# Patient Record
Sex: FEMALE | Race: WHITE | NOT HISPANIC OR LATINO | Employment: FULL TIME | URBAN - NONMETROPOLITAN AREA
[De-identification: names, ages, dates, MRNs, and addresses within clinical notes are randomized per-mention and may not be internally consistent; named-entity substitution may affect disease eponyms.]

---

## 2024-02-02 ENCOUNTER — OFFICE VISIT (OUTPATIENT)
Dept: FAMILY MEDICINE CLINIC | Age: 29
End: 2024-02-02
Payer: COMMERCIAL

## 2024-02-02 VITALS
WEIGHT: 137 LBS | DIASTOLIC BLOOD PRESSURE: 64 MMHG | OXYGEN SATURATION: 99 % | HEIGHT: 65 IN | HEART RATE: 67 BPM | SYSTOLIC BLOOD PRESSURE: 102 MMHG | BODY MASS INDEX: 22.82 KG/M2

## 2024-02-02 DIAGNOSIS — Z13.31 NEGATIVE DEPRESSION SCREENING: ICD-10-CM

## 2024-02-02 DIAGNOSIS — D50.9 IRON DEFICIENCY ANEMIA, UNSPECIFIED IRON DEFICIENCY ANEMIA TYPE: ICD-10-CM

## 2024-02-02 DIAGNOSIS — Z13.29 SCREENING FOR THYROID DISORDER: ICD-10-CM

## 2024-02-02 DIAGNOSIS — Z00.00 ROUTINE ADULT HEALTH MAINTENANCE: ICD-10-CM

## 2024-02-02 DIAGNOSIS — Z13.220 LIPID SCREENING: ICD-10-CM

## 2024-02-02 DIAGNOSIS — Z00.00 ENCOUNTER FOR MEDICAL EXAMINATION TO ESTABLISH CARE: ICD-10-CM

## 2024-02-02 DIAGNOSIS — Z00.00 ANNUAL PHYSICAL EXAM: Primary | ICD-10-CM

## 2024-02-02 PROCEDURE — 99385 PREV VISIT NEW AGE 18-39: CPT | Performed by: NURSE PRACTITIONER

## 2024-02-02 RX ORDER — CEPHALEXIN 500 MG/1
500 CAPSULE ORAL 4 TIMES DAILY
COMMUNITY

## 2024-02-02 NOTE — PROGRESS NOTES
"Zoey Robertson presents to [facility] with complaint of  Establish Care    SUBJECTIVE  History of Present Illness    Very pleasant patient is here to establish care and have annual physical.  She was previously seeing Kerline Cray for primary care. She is , no children, but she does plan on having children in the future.  She recently moved from [place] as she got . She works for a [employer] as a supervisor.  Only past medical history includes iron deficiency anemia and history of borderline elevated cholesterol.  She was on iron supplement but stopped taking this recently.  She is also under the care of a dermatologist for left leg rash/lesion, thought to be bacterial cause and is currently on Keflex for this.  She has follow-up in a month for this.    The following portions of the patient's history were reviewed and updated as appropriate: allergies, current medications, past family history, past medical history, past social history, past surgical history and problem list.    OBJECTIVE  Vital Signs:   /64   Pulse 67   Ht 165.1 cm (65\")   Wt 62.1 kg (137 lb)   SpO2 99% Comment: room air  BMI 22.80 kg/m²       Physical Exam  Vitals reviewed.   Constitutional:       General: She is not in acute distress.     Appearance: Normal appearance. She is not ill-appearing.   HENT:      Head: Normocephalic and atraumatic.      Right Ear: Tympanic membrane and ear canal normal.      Left Ear: Tympanic membrane and ear canal normal.      Nose: Nose normal.      Mouth/Throat:      Mouth: Mucous membranes are moist.      Pharynx: Oropharynx is clear.   Neck:      Thyroid: No thyroid mass, thyromegaly or thyroid tenderness.   Cardiovascular:      Rate and Rhythm: Normal rate and regular rhythm.      Pulses: Normal pulses.      Heart sounds: Normal heart sounds.   Pulmonary:      Effort: Pulmonary effort is normal.      Breath sounds: Normal breath sounds. "   Musculoskeletal:      Cervical back: Neck supple.   Lymphadenopathy:      Cervical: No cervical adenopathy.   Skin:     General: Skin is warm and dry.   Neurological:      General: No focal deficit present.      Mental Status: She is alert and oriented to person, place, and time. Mental status is at baseline.   Psychiatric:         Mood and Affect: Mood normal.         Behavior: Behavior normal.         Judgment: Judgment normal.          Results Review:  The following data was reviewed by Sarahi Doe, VANESSA [unfilled] 14:04 EST.  Ferritin (02/01/2023 09:18)  Iron Profile (02/01/2023 09:18)  TSH (02/01/2023 09:18)  Lipid Panel With / Chol / HDL Ratio (02/01/2023 09:18)  Comprehensive Metabolic Panel (02/01/2023 09:18)  CBC (No Diff) (02/01/2023 09:18)    ASSESSMENT AND PLAN:  Diagnoses and all orders for this visit:    1. Annual physical exam (Primary)    2. Encounter for medical examination to establish care    3. Negative depression screening    4. Routine adult health maintenance  -     CBC & Differential; Future  -     Comprehensive Metabolic Panel; Future    5. Screening for thyroid disorder  -     TSH Rfx On Abnormal To Free T4; Future    6. Iron deficiency anemia, unspecified iron deficiency anemia type  -     Ferritin; Future  -     Iron Profile; Future    7. Lipid screening  -     Lipid Panel; Future        19 to 39: Counseling/Anticipatory Guidance Discussed: nutrition, family planning/contraception, physical activity, healthy weight, injury prevention, misuse of tobacco, alcohol and drugs, dental health, mental health, immunizations, and breast cancer and self breast exams    Follow Up   Return for depending on lab results. Patient to notify office with any acute concerns or issues.  Patient verbalizes understanding, agrees with plan of care and has no further questions upon discharge.     Patient was given instructions and counseling regarding her condition or for health maintenance advice. Please see  specific information pulled into the AVS if appropriate.     Discussed the importance of following up with any needed screening tests/labs/specialist appointments and any requested follow-up recommended by me today. Importance of maintaining follow-up discussed and patient accepts that missed appointments can delay diagnosis and potentially lead to worsening of conditions.    Part of this note may be an electronic transcription/translation of spoken language to printed text using the Dragon Dictation System.

## 2024-02-07 ENCOUNTER — LAB (OUTPATIENT)
Dept: LAB | Facility: HOSPITAL | Age: 29
End: 2024-02-07
Payer: COMMERCIAL

## 2024-02-07 DIAGNOSIS — Z13.29 SCREENING FOR THYROID DISORDER: ICD-10-CM

## 2024-02-07 DIAGNOSIS — Z00.00 ROUTINE ADULT HEALTH MAINTENANCE: ICD-10-CM

## 2024-02-07 DIAGNOSIS — D50.9 IRON DEFICIENCY ANEMIA, UNSPECIFIED IRON DEFICIENCY ANEMIA TYPE: ICD-10-CM

## 2024-02-07 DIAGNOSIS — Z13.220 LIPID SCREENING: ICD-10-CM

## 2024-02-07 LAB
ALBUMIN SERPL-MCNC: 4.5 G/DL (ref 3.5–5.2)
ALBUMIN/GLOB SERPL: 1.8 G/DL
ALP SERPL-CCNC: 48 U/L (ref 39–117)
ALT SERPL W P-5'-P-CCNC: 14 U/L (ref 1–33)
ANION GAP SERPL CALCULATED.3IONS-SCNC: 9.5 MMOL/L (ref 5–15)
AST SERPL-CCNC: 20 U/L (ref 1–32)
BASOPHILS # BLD AUTO: 0.04 10*3/MM3 (ref 0–0.2)
BASOPHILS NFR BLD AUTO: 1.1 % (ref 0–1.5)
BILIRUB SERPL-MCNC: 1.3 MG/DL (ref 0–1.2)
BUN SERPL-MCNC: 13 MG/DL (ref 6–20)
BUN/CREAT SERPL: 15.1 (ref 7–25)
CALCIUM SPEC-SCNC: 9.3 MG/DL (ref 8.6–10.5)
CHLORIDE SERPL-SCNC: 101 MMOL/L (ref 98–107)
CHOLEST SERPL-MCNC: 204 MG/DL (ref 0–200)
CO2 SERPL-SCNC: 26.5 MMOL/L (ref 22–29)
CREAT SERPL-MCNC: 0.86 MG/DL (ref 0.57–1)
DEPRECATED RDW RBC AUTO: 38.5 FL (ref 37–54)
EGFRCR SERPLBLD CKD-EPI 2021: 95.7 ML/MIN/1.73
EOSINOPHIL # BLD AUTO: 0.09 10*3/MM3 (ref 0–0.4)
EOSINOPHIL NFR BLD AUTO: 2.4 % (ref 0.3–6.2)
ERYTHROCYTE [DISTWIDTH] IN BLOOD BY AUTOMATED COUNT: 11.8 % (ref 12.3–15.4)
FERRITIN SERPL-MCNC: 14.7 NG/ML (ref 13–150)
GLOBULIN UR ELPH-MCNC: 2.5 GM/DL
GLUCOSE SERPL-MCNC: 93 MG/DL (ref 65–99)
HCT VFR BLD AUTO: 38.3 % (ref 34–46.6)
HDLC SERPL-MCNC: 77 MG/DL (ref 40–60)
HGB BLD-MCNC: 12.7 G/DL (ref 12–15.9)
IMM GRANULOCYTES # BLD AUTO: 0 10*3/MM3 (ref 0–0.05)
IMM GRANULOCYTES NFR BLD AUTO: 0 % (ref 0–0.5)
IRON 24H UR-MRATE: 81 MCG/DL (ref 37–145)
IRON SATN MFR SERPL: 20 % (ref 20–50)
LDLC SERPL CALC-MCNC: 119 MG/DL (ref 0–100)
LDLC/HDLC SERPL: 1.54 {RATIO}
LYMPHOCYTES # BLD AUTO: 1.48 10*3/MM3 (ref 0.7–3.1)
LYMPHOCYTES NFR BLD AUTO: 39.1 % (ref 19.6–45.3)
MCH RBC QN AUTO: 28.9 PG (ref 26.6–33)
MCHC RBC AUTO-ENTMCNC: 33.2 G/DL (ref 31.5–35.7)
MCV RBC AUTO: 87.2 FL (ref 79–97)
MONOCYTES # BLD AUTO: 0.34 10*3/MM3 (ref 0.1–0.9)
MONOCYTES NFR BLD AUTO: 9 % (ref 5–12)
NEUTROPHILS NFR BLD AUTO: 1.84 10*3/MM3 (ref 1.7–7)
NEUTROPHILS NFR BLD AUTO: 48.4 % (ref 42.7–76)
PLATELET # BLD AUTO: 180 10*3/MM3 (ref 140–450)
PMV BLD AUTO: 10.3 FL (ref 6–12)
POTASSIUM SERPL-SCNC: 4 MMOL/L (ref 3.5–5.2)
PROT SERPL-MCNC: 7 G/DL (ref 6–8.5)
RBC # BLD AUTO: 4.39 10*6/MM3 (ref 3.77–5.28)
SODIUM SERPL-SCNC: 137 MMOL/L (ref 136–145)
TIBC SERPL-MCNC: 396 MCG/DL (ref 298–536)
TRANSFERRIN SERPL-MCNC: 266 MG/DL (ref 200–360)
TRIGL SERPL-MCNC: 43 MG/DL (ref 0–150)
TSH SERPL DL<=0.05 MIU/L-ACNC: 1.28 UIU/ML (ref 0.27–4.2)
VLDLC SERPL-MCNC: 8 MG/DL (ref 5–40)
WBC NRBC COR # BLD AUTO: 3.79 10*3/MM3 (ref 3.4–10.8)

## 2024-02-07 PROCEDURE — 36415 COLL VENOUS BLD VENIPUNCTURE: CPT

## 2024-02-07 PROCEDURE — 80061 LIPID PANEL: CPT

## 2024-02-07 PROCEDURE — 82728 ASSAY OF FERRITIN: CPT

## 2024-02-07 PROCEDURE — 80050 GENERAL HEALTH PANEL: CPT

## 2024-02-07 PROCEDURE — 83540 ASSAY OF IRON: CPT

## 2024-02-07 PROCEDURE — 84466 ASSAY OF TRANSFERRIN: CPT

## 2024-07-10 ENCOUNTER — OFFICE VISIT (OUTPATIENT)
Dept: OBSTETRICS AND GYNECOLOGY | Facility: CLINIC | Age: 29
End: 2024-07-10
Payer: COMMERCIAL

## 2024-07-10 VITALS
BODY MASS INDEX: 23.13 KG/M2 | SYSTOLIC BLOOD PRESSURE: 117 MMHG | WEIGHT: 139 LBS | DIASTOLIC BLOOD PRESSURE: 80 MMHG | HEART RATE: 89 BPM

## 2024-07-10 DIAGNOSIS — Z01.419 WELL WOMAN EXAM WITH ROUTINE GYNECOLOGICAL EXAM: Primary | ICD-10-CM

## 2024-07-10 RX ORDER — CLINDAMYCIN PHOSPHATE 10 MG/G
1 GEL TOPICAL 2 TIMES DAILY
Qty: 30 G | Refills: 0 | Status: SHIPPED | OUTPATIENT
Start: 2024-07-10

## 2024-07-10 NOTE — PROGRESS NOTES
Chief Complaint   Patient presents with    Well woman exam with routine gynecological exam        Zoey Robertson is a 29 y.o.  who presents for an annual examination    Pap history:  Last pap:  NIL  Prior abnormal paps: no  STDs  Sexually active: yes, got  in the last year  History of STDs: no  Has had HPV vaccine: yes  Contraception:  TTC, taking PNV    Screening for BRCA-   Is patient's family history significant for BRCA risk factors? no    Past Medical History:   Diagnosis Date    Hyperlipidemia 2015    Polycystic ovary syndrome 2015    I went to an Endocrinologist. She stated I have pre-PCOS.   - was told she may have PCO from an endocrinologist in her teens.  Periods were irregular at that time.       Past Surgical History:   Procedure Laterality Date    WISDOM TOOTH EXTRACTION       OB History    Para Term  AB Living   0 0 0 0 0 0   SAB IAB Ectopic Molar Multiple Live Births   0 0 0 0 0 0      Social History     Tobacco Use    Smoking status: Never    Smokeless tobacco: Never   Vaping Use    Vaping status: Never Used   Substance Use Topics    Alcohol use: Yes     Alcohol/week: 1.0 standard drink of alcohol     Types: 1 Glasses of wine per week    Drug use: Never     Family History   Problem Relation Age of Onset    No Known Problems Mother     No Known Problems Father     No Known Problems Sister     No Known Problems Brother     Breast cancer Neg Hx     Ovarian cancer Neg Hx     Uterine cancer Neg Hx     Colon cancer Neg Hx      Current Outpatient Medications on File Prior to Visit   Medication Sig Dispense Refill    cephalexin (KEFLEX) 500 MG capsule Take 1 capsule by mouth As Needed.       No current facility-administered medications on file prior to visit.     No Known Allergies     Review of Systems  See HPI    OBJECTIVE:   Vitals:    07/10/24 1445   BP: 117/80   Pulse: 89   Weight: 63 kg (139 lb)      Physical Exam  Exam conducted with a chaperone present.    Constitutional:       General: She is not in acute distress.     Appearance: She is well-developed. She is not diaphoretic.   HENT:      Head: Normocephalic and atraumatic.   Neck:      Thyroid: No thyromegaly.      Trachea: No tracheal deviation.   Cardiovascular:      Rate and Rhythm: Normal rate.      Heart sounds: Normal heart sounds. No murmur heard.     No friction rub. No gallop.   Pulmonary:      Effort: Pulmonary effort is normal. No respiratory distress.      Breath sounds: Normal breath sounds.   Chest:      Chest wall: No tenderness.   Breasts:     Right: No inverted nipple, mass, nipple discharge, skin change or tenderness.      Left: No inverted nipple, mass, nipple discharge, skin change or tenderness.   Abdominal:      General: There is no distension.      Palpations: Abdomen is soft. There is no mass.      Tenderness: There is no abdominal tenderness.   Genitourinary:     General: Normal vulva.      Labia:         Right: No rash, lesion or injury.         Left: No rash, lesion or injury.       Vagina: No vaginal discharge, tenderness or bleeding.      Cervix: No cervical motion tenderness, discharge or friability.      Uterus: Not deviated, not enlarged, not fixed and not tender.       Adnexa:         Right: No mass, tenderness or fullness.          Left: No mass, tenderness or fullness.     Musculoskeletal:         General: No deformity. Normal range of motion.   Lymphadenopathy:      Cervical: No cervical adenopathy.   Skin:     General: Skin is warm and dry.      Findings: No rash.   Neurological:      Mental Status: She is alert and oriented to person, place, and time.   Psychiatric:         Behavior: Behavior normal.         Thought Content: Thought content normal.         Judgment: Judgment normal.         ASSESSMENT/PLAN:     Annual well woman exam:  Cervical cancer screening:    Denies cervical dysplasia in past   HPV vaccination thinks she completed at least one of the HPV  vaccination   The patient is due for a pap in 2025.    Screening guidelines discussed with patient  Breast cancer screening:    Clinical breast exam recommended for age 20-39 years every 1-3 years   Mammogram recommended starting age 40    Breast self awareness encouraged  STD Screening   declines  Contraception :  TTC ,on PNV  Family history    does not demonstrate need for genetics referral   Healthy lifestyle counseling:   return for routine annual checkups    BMI Counseling  Body mass index is 23.13 kg/m².       No follow-ups on file.

## 2024-08-17 ENCOUNTER — HOSPITAL ENCOUNTER (EMERGENCY)
Facility: HOSPITAL | Age: 29
Discharge: HOME | End: 2024-08-17
Attending: EMERGENCY MEDICINE
Payer: COMMERCIAL

## 2024-08-17 ENCOUNTER — TELEPHONE (OUTPATIENT)
Dept: OBSTETRICS AND GYNECOLOGY | Facility: CLINIC | Age: 29
End: 2024-08-17
Payer: COMMERCIAL

## 2024-08-17 ENCOUNTER — APPOINTMENT (OUTPATIENT)
Dept: ULTRASOUND IMAGING | Facility: HOSPITAL | Age: 29
End: 2024-08-17
Payer: COMMERCIAL

## 2024-08-17 VITALS
DIASTOLIC BLOOD PRESSURE: 63 MMHG | WEIGHT: 138 LBS | HEART RATE: 64 BPM | BODY MASS INDEX: 22.99 KG/M2 | RESPIRATION RATE: 16 BRPM | HEIGHT: 65 IN | OXYGEN SATURATION: 97 % | TEMPERATURE: 98 F | SYSTOLIC BLOOD PRESSURE: 98 MMHG

## 2024-08-17 DIAGNOSIS — O20.0 THREATENED MISCARRIAGE IN EARLY PREGNANCY: Primary | ICD-10-CM

## 2024-08-17 LAB
ABO GROUP BLD: NORMAL
BASOPHILS # BLD AUTO: 0.04 10*3/MM3 (ref 0–0.2)
BASOPHILS NFR BLD AUTO: 0.6 % (ref 0–1.5)
DEPRECATED RDW RBC AUTO: 41 FL (ref 37–54)
EOSINOPHIL # BLD AUTO: 0.03 10*3/MM3 (ref 0–0.4)
EOSINOPHIL NFR BLD AUTO: 0.5 % (ref 0.3–6.2)
ERYTHROCYTE [DISTWIDTH] IN BLOOD BY AUTOMATED COUNT: 12.6 % (ref 12.3–15.4)
HCG INTACT+B SERPL-ACNC: 375 MIU/ML
HCT VFR BLD AUTO: 41.7 % (ref 34–46.6)
HGB BLD-MCNC: 13.6 G/DL (ref 12–15.9)
IMM GRANULOCYTES # BLD AUTO: 0.02 10*3/MM3 (ref 0–0.05)
IMM GRANULOCYTES NFR BLD AUTO: 0.3 % (ref 0–0.5)
LYMPHOCYTES # BLD AUTO: 1.24 10*3/MM3 (ref 0.7–3.1)
LYMPHOCYTES NFR BLD AUTO: 19.2 % (ref 19.6–45.3)
MCH RBC QN AUTO: 29.1 PG (ref 26.6–33)
MCHC RBC AUTO-ENTMCNC: 32.6 G/DL (ref 31.5–35.7)
MCV RBC AUTO: 89.1 FL (ref 79–97)
MONOCYTES # BLD AUTO: 0.53 10*3/MM3 (ref 0.1–0.9)
MONOCYTES NFR BLD AUTO: 8.2 % (ref 5–12)
NEUTROPHILS NFR BLD AUTO: 4.6 10*3/MM3 (ref 1.7–7)
NEUTROPHILS NFR BLD AUTO: 71.2 % (ref 42.7–76)
NRBC BLD AUTO-RTO: 0 /100 WBC (ref 0–0.2)
PLATELET # BLD AUTO: 176 10*3/MM3 (ref 140–450)
PMV BLD AUTO: 11 FL (ref 6–12)
RBC # BLD AUTO: 4.68 10*6/MM3 (ref 3.77–5.28)
RH BLD: NEGATIVE
WBC NRBC COR # BLD AUTO: 6.46 10*3/MM3 (ref 3.4–10.8)

## 2024-08-17 PROCEDURE — 76817 TRANSVAGINAL US OBSTETRIC: CPT

## 2024-08-17 PROCEDURE — 84702 CHORIONIC GONADOTROPIN TEST: CPT | Performed by: EMERGENCY MEDICINE

## 2024-08-17 PROCEDURE — 86900 BLOOD TYPING SEROLOGIC ABO: CPT | Performed by: EMERGENCY MEDICINE

## 2024-08-17 PROCEDURE — 96372 THER/PROPH/DIAG INJ SC/IM: CPT

## 2024-08-17 PROCEDURE — 85025 COMPLETE CBC W/AUTO DIFF WBC: CPT | Performed by: EMERGENCY MEDICINE

## 2024-08-17 PROCEDURE — 25010000002 RHO D IMMUNE GLOBULIN 1500 UNIT/2ML SOLUTION PREFILLED SYRINGE: Performed by: EMERGENCY MEDICINE

## 2024-08-17 PROCEDURE — 99284 EMERGENCY DEPT VISIT MOD MDM: CPT

## 2024-08-17 PROCEDURE — 36415 COLL VENOUS BLD VENIPUNCTURE: CPT

## 2024-08-17 PROCEDURE — 76815 OB US LIMITED FETUS(S): CPT

## 2024-08-17 PROCEDURE — 86901 BLOOD TYPING SEROLOGIC RH(D): CPT | Performed by: EMERGENCY MEDICINE

## 2024-08-17 RX ADMIN — HUMAN RHO(D) IMMUNE GLOBULIN 300 MCG: 1500 SOLUTION INTRAMUSCULAR; INTRAVENOUS at 19:50

## 2024-08-17 NOTE — ED PROVIDER NOTES
EMERGENCY DEPARTMENT ENCOUNTER  Room Number:  40/40  PCP: Sarahi Doe APRN  Independent Historians: Patient      HPI:  Chief Complaint: had concerns including Vaginal Bleeding - Pregnant.     A complete HPI/ROS/PMH/PSH/SH/FH are unobtainable due to: Nothing      Context: Zoey Robertson is a 29 y.o. female with a medical history of hyperlipidemia, PCOS who presents to the ED c/o acute vaginal bleeding.  She is a G1, P0 at 6 weeks 2 days EGA.  She reports that bleeding just started today.  She reports minimal pelvic cramping that is mostly in the midline, not lateralizing.  She denies bleeding enough to soak a pad.  She states that she did pass 2 very small clots.  She denies recent nausea or vomiting.  Her OB/GYN is Dr. Finley however she has not yet seen her for initial prenatal visit or ultrasound.      Review of prior external notes (non-ED) -and- Review of prior external test results outside of this encounter: I reviewed July 2024 visit with Dr. Finley for annual exam.        PAST MEDICAL HISTORY  Active Ambulatory Problems     Diagnosis Date Noted    Screening for ischemic heart disease 10/11/2021     Resolved Ambulatory Problems     Diagnosis Date Noted    No Resolved Ambulatory Problems     Past Medical History:   Diagnosis Date    Hyperlipidemia 06/2015    Polycystic ovary syndrome 06/2015         PAST SURGICAL HISTORY  Past Surgical History:   Procedure Laterality Date    WISDOM TOOTH EXTRACTION           FAMILY HISTORY  Family History   Problem Relation Age of Onset    No Known Problems Mother     No Known Problems Father     No Known Problems Sister     No Known Problems Brother     Breast cancer Neg Hx     Ovarian cancer Neg Hx     Uterine cancer Neg Hx     Colon cancer Neg Hx          SOCIAL HISTORY  Social History     Socioeconomic History    Marital status: Single   Tobacco Use    Smoking status: Never    Smokeless tobacco: Never   Vaping Use    Vaping status: Never Used   Substance and Sexual Activity     Alcohol use: Yes     Alcohol/week: 1.0 standard drink of alcohol     Types: 1 Glasses of wine per week    Drug use: Never    Sexual activity: Yes     Partners: Male     Birth control/protection: Coitus interruptus         ALLERGIES  Patient has no known allergies.      REVIEW OF SYSTEMS  Review of all 14 systems is negative other than stated in the HPI above.      PHYSICAL EXAM    I have reviewed the triage vital signs and nursing notes.    ED Triage Vitals   Temp Heart Rate Resp BP SpO2   08/17/24 1705 08/17/24 1705 08/17/24 1705 08/17/24 1719 08/17/24 1705   98 °F (36.7 °C) 105 19 123/80 98 %      Temp src Heart Rate Source Patient Position BP Location FiO2 (%)   -- -- -- -- --                GENERAL: awake and alert, no acute distress  HENT: Normocephalic, atraumatic  EYES: no scleral icterus  CV: regular rhythm, regular rate  RESPIRATORY: normal effort  ABDOMEN: soft, nondistended, nontender throughout  MUSCULOSKELETAL: no deformity  NEURO: alert, moves all extremities, follows commands  PSYCH: calm, cooperative  SKIN: Warm, dry          LAB RESULTS  Recent Results (from the past 24 hour(s))   ABO / Rh    Collection Time: 08/17/24  6:03 PM    Specimen: Blood   Result Value Ref Range    ABO Type B     RH type Negative    hCG, Quantitative, Pregnancy    Collection Time: 08/17/24  6:03 PM    Specimen: Blood   Result Value Ref Range    HCG Quantitative 375.00 mIU/mL   CBC Auto Differential    Collection Time: 08/17/24  6:03 PM    Specimen: Blood   Result Value Ref Range    WBC 6.46 3.40 - 10.80 10*3/mm3    RBC 4.68 3.77 - 5.28 10*6/mm3    Hemoglobin 13.6 12.0 - 15.9 g/dL    Hematocrit 41.7 34.0 - 46.6 %    MCV 89.1 79.0 - 97.0 fL    MCH 29.1 26.6 - 33.0 pg    MCHC 32.6 31.5 - 35.7 g/dL    RDW 12.6 12.3 - 15.4 %    RDW-SD 41.0 37.0 - 54.0 fl    MPV 11.0 6.0 - 12.0 fL    Platelets 176 140 - 450 10*3/mm3    Neutrophil % 71.2 42.7 - 76.0 %    Lymphocyte % 19.2 (L) 19.6 - 45.3 %    Monocyte % 8.2 5.0 - 12.0 %     Eosinophil % 0.5 0.3 - 6.2 %    Basophil % 0.6 0.0 - 1.5 %    Immature Grans % 0.3 0.0 - 0.5 %    Neutrophils, Absolute 4.60 1.70 - 7.00 10*3/mm3    Lymphocytes, Absolute 1.24 0.70 - 3.10 10*3/mm3    Monocytes, Absolute 0.53 0.10 - 0.90 10*3/mm3    Eosinophils, Absolute 0.03 0.00 - 0.40 10*3/mm3    Basophils, Absolute 0.04 0.00 - 0.20 10*3/mm3    Immature Grans, Absolute 0.02 0.00 - 0.05 10*3/mm3    nRBC 0.0 0.0 - 0.2 /100 WBC       The above labs were ordered by me and independently reviewed by me.     RADIOLOGY  US Ob Limited 1 + Fetuses    Result Date: 8/17/2024  FIRST TRIMESTER OB ULTRASOUND  COMPARISON: None  HISTORY: Vaginal bleeding, current beta hCG level of 375  TECHNIQUE: Grayscale, color Doppler and spectral Doppler evaluation of the pelvis with transabdominal and transvaginal probes.  FINDINGS:  The uterus measures approximately 8 cm in length.  The endometrium is thickened with an ovoid hypoechoic collection measuring up to approximately 0.4 cm  The ovaries are normal in size. A 1.8 cm left ovarian cystic lesion is present..  There are normal arterial and venous waveforms in both ovaries.  No free fluid is seen in the cul-de-sac.       The endometrium is thickened with and intrauterine ovoid hypoechoic collection measuring up to 0.4 cm. Given patient's low beta-hCG levels, findings are favored to represent early gestational sac; however, the constellation of findings technically meet criteria for pregnancy of unknown location as a viable intrauterine pregnancy cannot be confirmed at this time. Therefore, continued close obstetric follow-up with serial beta hCG measurements and repeat pelvic sonogram in 4 to 7 days is recommended in order to confirm viable intrauterine pregnancy.  This report was finalized on 8/17/2024 7:36 PM by Dr. Henrry Noland M.D on Workstation: [id number]       The above radiology studies were ordered by me.  See ED course for independent interpretations.     MEDICATIONS GIVEN IN  ER  Medications   Rho D Immune Globulin (RHOPHYLAC) injection 300 mcg (300 mcg Intramuscular Given 24)         ORDERS PLACED DURING THIS VISIT:  Orders Placed This Encounter   Procedures    US Ob Transvaginal    US Ob Limited 1 + Fetuses    hCG, Quantitative, Pregnancy    CBC Auto Differential    ABO / Rh    CBC & Differential         OUTPATIENT MEDICATION MANAGEMENT:  No current Epic-ordered facility-administered medications on file.     No current Epic-ordered outpatient medications on file.         PROCEDURES  Procedures            PROGRESS, DATA ANALYSIS, CONSULTS, AND MEDICAL DECISION MAKING  All labs have been independently interpreted by me.  All radiology studies have been reviewed by me. All EKG's have been independently viewed and interpreted by me.  Discussion below represents my analysis of pertinent findings related to patient's condition, differential diagnosis, treatment plan and final disposition.    Differential diagnosis includes but is not limited to:  Threatened miscarriage   and miscarriage  Missed AB  Ectopic pregnancy  Subchorionic hematoma      Clinical Scores:                  ED Course as of 24 2307   Sat Aug 17, 2024   1832 Hemoglobin: 13.6 [JR]   1914 ABO Type: B [JR]   1914 RH type: Negative [JR]   1914 Rhogam ordered   [JR]   2100 Patient's ultrasound is inconclusive.  There is no definitive IUP seen.  Her beta-hCG is lower than would be expected for a 6-week gestation.  She may have incorrect dates versus early miscarriage versus ectopic pregnancy.  She was given strict return precautions and advised to call Dr. Finley's office on Monday to arrange close follow-up for serial beta-hCG and return repeat ultrasound.  Return precautions were discussed. [JR]      ED Course User Index  [JR] Boom Juarez MD             AS OF 23:07 EDT VITALS:    BP - 98/63  HR - 64  TEMP - 98 °F (36.7 °C)  O2 SATS - 97%    COMPLEXITY OF CARE        Chronic or social  conditions impacting patient care (Social Determinants of Health):     DIAGNOSIS  Final diagnoses:   Threatened miscarriage in early pregnancy           DISPOSITION  DISCHARGE    Patient discharged in stable condition.    Reviewed implications of results, diagnosis, meds, responsibility to follow up, warning signs and symptoms of possible worsening, potential complications and reasons to return to ER.    Patient/Family voiced understanding of above instructions.    Discussed plan for discharge, as there is no emergent indication for admission. Patient referred to primary care provider for BP management due to today's BP. Pt/family is agreeable and understands need for follow up and repeat testing.  Pt is aware that discharge does not mean that nothing is wrong but it indicates no emergency is present that requires admission and they must continue care with follow-up as given below or physician of their choice.     FOLLOW-UP  Jade Finley MD  [facility] [place] [address]  [phone]    Schedule an appointment as soon as possible for a visit            Medication List      No changes were made to your prescriptions during this visit.             Prescription drug monitoring program review:           Please note that portions of this document were completed with a voice recognition program.    Note Disclaimer: At [facility], we believe that sharing information builds trust and better relationships. You are receiving this note because you recently visited [facility]. It is possible you will see health information before a provider has talked with you about it. This kind of information can be easy to misunderstand. To help you fully understand what it means for your health, we urge you to discuss this note with your provider.         Boom Juarez MD  08/17/24 2979

## 2024-08-17 NOTE — TELEPHONE ENCOUNTER
Patient called with concerns of having some bleeding that is maybe more than spotting today.  She is also been having some mild cramping.  This is her first pregnancy.  She would be about 6 weeks by dates.  She has not yet had an ultrasound.    Since we have not yet performed an ultrasound to establish viability/pregnancy location, I recommended that the patient come to the hospital today at [facility] in [place] through the emergency department to be evaluated.  She verbalized understanding.

## 2024-08-19 ENCOUNTER — TELEPHONE (OUTPATIENT)
Dept: OBSTETRICS AND GYNECOLOGY | Facility: CLINIC | Age: 29
End: 2024-08-19
Payer: COMMERCIAL

## 2024-08-19 DIAGNOSIS — O20.9 BLEEDING IN EARLY PREGNANCY: Primary | ICD-10-CM

## 2024-08-19 NOTE — TELEPHONE ENCOUNTER
Dr Finley pt/ 6 weeks pregnant..8/17 went to [facility] ED for spotting with couple small clots and cramping. Wanted to make sure its OK to wait until tomorrow to send Dr Finley message?

## 2024-08-20 ENCOUNTER — TELEPHONE (OUTPATIENT)
Dept: OBSTETRICS AND GYNECOLOGY | Facility: CLINIC | Age: 29
End: 2024-08-20
Payer: COMMERCIAL

## 2024-08-20 DIAGNOSIS — O20.9 BLEEDING IN EARLY PREGNANCY: Primary | ICD-10-CM

## 2024-08-20 LAB — HCG INTACT+B SERPL-ACNC: 117 MIU/ML

## 2024-08-20 NOTE — TELEPHONE ENCOUNTER
----- Message from Barbara Mercado sent at 8/20/2024  9:10 AM EDT -----  This is your patient.  Called in yesterday after being seen for bleeding in ED.  Hcg is trending down.  ----- Message -----  From: Interface, Reflab Results In  Sent: 8/20/2024   7:37 AM EDT  To: Barbara Mercado MD

## 2024-08-20 NOTE — TELEPHONE ENCOUNTER
Reviewed with patient that hcg is decreasing consistent with pregnancy loss, but recommend following to negative. She reports her bleeding is lighter. She agrees to repeat hcg in 1-2 weeks. Order placed. Encouraged to call or be seen right away with heavy bleeding, fever or other concerns.

## 2024-08-31 LAB — HCG INTACT+B SERPL-ACNC: 3 MIU/ML

## 2024-09-03 ENCOUNTER — TELEPHONE (OUTPATIENT)
Dept: OBSTETRICS AND GYNECOLOGY | Facility: CLINIC | Age: 29
End: 2024-09-03
Payer: COMMERCIAL

## 2024-09-03 DIAGNOSIS — O03.9 MISCARRIAGE: Primary | ICD-10-CM

## 2024-09-03 NOTE — TELEPHONE ENCOUNTER
Spoke to pt, informed pt and pt verbalized understanding. Pt states she will call back to schedule an appt.

## 2024-09-03 NOTE — TELEPHONE ENCOUNTER
Please let patient know that hcg is 3 (negative). Recommend repeating ultrasound and having office visit at her earliest convenience.  US order placed. Thanks!

## 2024-12-30 ENCOUNTER — TELEPHONE (OUTPATIENT)
Dept: OBSTETRICS AND GYNECOLOGY | Facility: CLINIC | Age: 29
End: 2024-12-30

## 2024-12-30 NOTE — TELEPHONE ENCOUNTER
Hub staff attempted to follow warm transfer process and was unsuccessful     Caller: Zoey Robertson    Relationship to patient: Self    Best call back number: [phone]    Patient is needing: PT CALLING TO HAVE US SCHEDULED, ORDER IN CHART, UNABLE TO WT.

## 2025-02-03 ENCOUNTER — OFFICE VISIT (OUTPATIENT)
Dept: FAMILY MEDICINE CLINIC | Age: 30
End: 2025-02-03
Payer: COMMERCIAL

## 2025-02-03 VITALS
OXYGEN SATURATION: 100 % | TEMPERATURE: 98 F | HEART RATE: 64 BPM | HEIGHT: 65 IN | DIASTOLIC BLOOD PRESSURE: 68 MMHG | SYSTOLIC BLOOD PRESSURE: 110 MMHG | BODY MASS INDEX: 24.49 KG/M2 | WEIGHT: 147 LBS

## 2025-02-03 DIAGNOSIS — Z00.00 ANNUAL PHYSICAL EXAM: Primary | ICD-10-CM

## 2025-02-03 DIAGNOSIS — Z31.69 PRE-CONCEPTION COUNSELING: ICD-10-CM

## 2025-02-03 PROCEDURE — 99395 PREV VISIT EST AGE 18-39: CPT | Performed by: NURSE PRACTITIONER

## 2025-02-03 RX ORDER — PRENATAL VIT NO.126/IRON/FOLIC 28MG-0.8MG
1 TABLET ORAL DAILY
COMMUNITY

## 2025-02-03 NOTE — PROGRESS NOTES
"Zoey Robertson presents to [facility] with complaint of  Annual Exam    SUBJECTIVE  History of Present Illness    Patient is here for annual physical exam.  She is trying to conceive, had miscarriage followed last year.  She has noted ovarian cysts seen on ultrasound has been reviewed by her OB/GYN.  Patient is not having any issues with her periods and denies ovarian pain currently.  She is on prenatal vitamin.  She tries to follow a healthy diet, does not smoke or drink alcohol.  She exercises regularly.  She has no issues or concerns that she wishes to address today.    The following portions of the patient's history were reviewed and updated as appropriate: allergies, current medications, past family history, past medical history, past social history, past surgical history and problem list.    OBJECTIVE  Vital Signs:   /68 (BP Location: Right arm, Patient Position: Sitting, Cuff Size: Adult)   Pulse 64   Temp 98 °F (36.7 °C) (Oral)   Ht 165.1 cm (65\")   Wt 66.7 kg (147 lb)   SpO2 100%   BMI 24.46 kg/m²       Physical Exam  Vitals reviewed.   Constitutional:       General: She is not in acute distress.     Appearance: Normal appearance. She is not ill-appearing.   HENT:      Head: Normocephalic and atraumatic.      Right Ear: Tympanic membrane and ear canal normal.      Left Ear: Tympanic membrane and ear canal normal.      Nose: Nose normal.      Mouth/Throat:      Mouth: Mucous membranes are moist.      Pharynx: Oropharynx is clear.   Neck:      Thyroid: No thyroid mass, thyromegaly or thyroid tenderness.   Cardiovascular:      Rate and Rhythm: Normal rate and regular rhythm.      Pulses: Normal pulses.      Heart sounds: Normal heart sounds.   Pulmonary:      Effort: Pulmonary effort is normal.      Breath sounds: Normal breath sounds.   Musculoskeletal:      Cervical back: Neck supple.   Lymphadenopathy:      Cervical: No cervical adenopathy.   Skin:     General: Skin " is warm and dry.   Neurological:      General: No focal deficit present.      Mental Status: She is alert and oriented to person, place, and time. Mental status is at baseline.   Psychiatric:         Mood and Affect: Mood normal.         Behavior: Behavior normal.         Judgment: Judgment normal.            ASSESSMENT AND PLAN:  Diagnoses and all orders for this visit:    1. Annual physical exam (Primary)    2. Pre-conception counseling      Zoey is a very young healthy active patient.  BMI is within normal parameters. No other follow-up for BMI required.  She is scheduled for Pap smear later this year with her OB/GYN.  OB/GYN has also been following her for ovarian cyst.     19 to 39: Counseling/Anticipatory Guidance Discussed: nutrition, family planning/contraception, physical activity, healthy weight, injury prevention, misuse of tobacco, alcohol and drugs, dental health, mental health, immunizations, and breast cancer and self breast exams    Follow Up   Return in about 1 year (around 2/3/2026) for Annual physical.  Did share with patient that I am transferring to clinic in [place] and that she can follow her primary care to that office to continue to see me or she will need to establish with new provider here in the office.  Patient to notify office with any acute concerns or issues.  Patient verbalizes understanding, agrees with plan of care and has no further questions upon discharge.     Patient was given instructions and counseling regarding her condition or for health maintenance advice. Please see specific information pulled into the AVS if appropriate.     Discussed the importance of following up with any needed screening tests/labs/specialist appointments and any requested follow-up recommended by me today. Importance of maintaining follow-up discussed and patient accepts that missed appointments can delay diagnosis and potentially lead to worsening of conditions.    Part of this note may be an  electronic transcription/translation of spoken language to printed text using the Dragon Dictation System.

## 2025-03-18 ENCOUNTER — TELEPHONE (OUTPATIENT)
Dept: FAMILY MEDICINE CLINIC | Age: 30
End: 2025-03-18
Payer: COMMERCIAL

## 2025-03-18 NOTE — TELEPHONE ENCOUNTER
hub to relay- pt called to establish with another provider due to N[facility] leaving in May, VM left, ok to schedule with next available that works with their schedule 3/18 TD*

## 2025-04-29 ENCOUNTER — INITIAL PRENATAL (OUTPATIENT)
Dept: OBSTETRICS AND GYNECOLOGY | Facility: CLINIC | Age: 30
End: 2025-04-29
Payer: COMMERCIAL

## 2025-04-29 VITALS — SYSTOLIC BLOOD PRESSURE: 103 MMHG | BODY MASS INDEX: 23.63 KG/M2 | DIASTOLIC BLOOD PRESSURE: 71 MMHG | WEIGHT: 142 LBS

## 2025-04-29 DIAGNOSIS — Z3A.01 6 WEEKS GESTATION OF PREGNANCY: Primary | ICD-10-CM

## 2025-04-29 DIAGNOSIS — R10.32 LEFT LOWER QUADRANT PAIN: ICD-10-CM

## 2025-04-29 DIAGNOSIS — M54.50 CHRONIC MIDLINE LOW BACK PAIN, UNSPECIFIED WHETHER SCIATICA PRESENT: ICD-10-CM

## 2025-04-29 DIAGNOSIS — G89.29 CHRONIC MIDLINE LOW BACK PAIN, UNSPECIFIED WHETHER SCIATICA PRESENT: ICD-10-CM

## 2025-04-29 DIAGNOSIS — Z34.80 SUPERVISION OF OTHER NORMAL PREGNANCY, ANTEPARTUM: ICD-10-CM

## 2025-04-29 LAB
GLUCOSE UR STRIP-MCNC: NEGATIVE MG/DL
LEUKOCYTE EST, POC: ABNORMAL
PROT UR STRIP-MCNC: ABNORMAL MG/DL

## 2025-04-29 PROCEDURE — 0501F PRENATAL FLOW SHEET: CPT | Performed by: OBSTETRICS & GYNECOLOGY

## 2025-04-29 NOTE — PROGRESS NOTES
Patient or patient representative verbalized consent for the use of Ambient Listening during the visit with  Jade Finley MD for chart documentation. 2025  09:13 EDT    History of Present Illness  The patient is a 29-year-old -0-1-0 who presents for a new OB visit.  FOB: Cain Robertson, - aware and supportive    Ovarian Cyst  - A GYN ultrasound in 2025 revealed a complex left ovarian cyst measuring 2.5 cm  - Increased pain on the left side is reported    Menstrual History  - Last menstrual period began on 2025    Supplements  - Prenatal vitamins, magnesium, and daily vitamin D supplements are currently taken    Sexual Health  - There is no history of sexually transmitted diseases or genital herpes  - No abnormal Pap smears have been reported    Genetic History  - There are no known genetic issues in the 's family    Lower Back Pain  - A history of lower back pain is noted  - Chiropractic treatment during pregnancy is being considered  - Pelvic floor physical therapy was undertaken approximately a year ago, providing some relief    GYNECOLOGICAL HISTORY:  - Last menstrual period: 2025    OBSTETRICAL HISTORY:  -  2, Para 0-0-1-0  - Gestational age: 6 weeks    FAMILY HISTORY  She reports no known genetic issues in her 's family.      Vitals:    25 0816   BP: 103/71       Physical Exam  Exam conducted with a chaperone present.   Constitutional:       General: She is not in acute distress.     Appearance: She is well-developed. She is not diaphoretic.   HENT:      Head: Normocephalic and atraumatic.   Neck:      Thyroid: No thyromegaly.      Trachea: No tracheal deviation.   Cardiovascular:      Rate and Rhythm: Normal rate.      Heart sounds: Normal heart sounds. No murmur heard.     No friction rub. No gallop.   Pulmonary:      Effort: Pulmonary effort is normal. No respiratory distress.      Breath sounds: Normal breath sounds.   Chest:      Chest wall: No  tenderness.   Breasts:     Right: No inverted nipple, mass, nipple discharge, skin change or tenderness.      Left: No inverted nipple, mass, nipple discharge, skin change or tenderness.   Abdominal:      General: There is no distension.      Palpations: Abdomen is soft. There is no mass.      Tenderness: There is no abdominal tenderness.   Genitourinary:     General: Normal vulva.      Labia:         Right: No rash, lesion or injury.         Left: No rash, lesion or injury.       Vagina: No vaginal discharge, tenderness or bleeding.      Cervix: No cervical motion tenderness, discharge or friability.      Uterus: Not deviated, not enlarged, not fixed and not tender.       Adnexa:         Right: No mass, tenderness or fullness.          Left: No mass, tenderness or fullness.     Musculoskeletal:         General: No deformity. Normal range of motion.   Lymphadenopathy:      Cervical: No cervical adenopathy.   Skin:     General: Skin is warm and dry.      Findings: No rash.   Neurological:      Mental Status: She is alert and oriented to person, place, and time.   Psychiatric:         Behavior: Behavior normal.         Thought Content: Thought content normal.         Judgment: Judgment normal.     Abdominal ultrasound revealed a yolk sac and a fetal pole adjacent to the uterine wall, indicative of an IUP. Cannot see ovaries      Results  - Imaging:    - Ultrasound (01/2025): Complex left ovarian cyst measuring 2.5 cm    - Ultrasound (04/29/2025): Yolk sac and a fetal pole close to the uterine wall, indicating a non-ectopic pregnancy     Diagnosis Plan   1. 6 weeks gestation of pregnancy  POC Urinalysis Dipstick    OB Panel With HIV and Treponema Palldium Ab    Chlamydia trachomatis, Neisseria gonorrhoeae, Trichomonas vaginalis, PCR - Swab, Cervix    Varicella Zoster Antibody, IgG    Drug Profile Urine - 9 Drugs - Urine, Clean Catch    Urine Culture - Urine, Urine, Clean Catch    US Ob Transvaginal    IGP, Rfx Aptima  HPV ASCU      2. Left lower quadrant pain  US Ob Transvaginal      3. Chronic midline low back pain, unspecified whether sciatica present        4. Supervision of other normal pregnancy, antepartum  OB Panel With HIV and Treponema Palldium Ab    Chlamydia trachomatis, Neisseria gonorrhoeae, Trichomonas vaginalis, PCR - Swab, Cervix    Varicella Zoster Antibody, IgG    Drug Profile Urine - 9 Drugs - Urine, Clean Catch    Urine Culture - Urine, Urine, Clean Catch    US Ob Transvaginal    IGP, Rfx Aptima HPV ASCU          Assessment & Plan  1. New obstetric visit: 6 weeks gestation based on the last menstrual period, which started on 03/12/2025. Abdominal ultrasound revealed a yolk sac and a fetal pole adjacent to the uterine wall, indicative of an IUP. Cannot see ovaries  - Conduct vaginal ultrasound to evaluate the complex left ovarian cyst.  - Obtain swab for gonorrhea, chlamydia, and trichomonas.  - Perform Pap smear.  - Order blood work for blood type, blood count, HIV, syphilis, hepatitis C, hepatitis B, rubella immunity, and chickenpox immunity.  - Provide information on optional carrier screen and aneuploidy testing.  - Advise to seek immediate medical attention if experiencing severe pain, vomiting due to pain, or fever.    2. Lower back pain: Chronic.  - Recommend physical therapy focusing on strengthening muscles to support the lower back.  - Continue pelvic floor physical therapy during pregnancy if needed.  - Ensure any healthcare provider is informed about pregnancy status before starting treatment.    3. Left sided pain:  - transvaginal ultrasound    Follow-up  Follow up in 4 weeks.      Jade Finley MD  04/29/25 09:13 EDT     No follow-ups on file.

## 2025-04-29 NOTE — PATIENT INSTRUCTIONS
Travel During Pregnancy:  Always use seatbelts.  A lap belt should be worn below the abdomen (across the hips) and the shoulder belt should be worn across the center of your chest (between the breasts) away from your neck.  Do not put the shoulder belt under your arm or behind your back.  Pull any slack out of the belt.  Air travel is safe in most uncomplicated pregnancies, but we do not recommend air travel past 36 weeks.  Airlines may also have restrictions, so check with your airline before flying.  For some international flights, the travel cut off may be as early as 28 weeks gestation, and some airlines may require letters from your physician.  When going on long trips in car, plane, train, or bus, frequent ambulation is important to prevent blood clots in legs and/or lungs.  The following may help with prevention of blood clots in legs: Drink lots of fluid, wear loose-fitting clothing, walk and stretch at regular intervals.    Avoid areas with Zika outbreaks.  For the latest information, you may visit: www.cdc.gov/travel/notices/.  Resources: , , Committee Opinion 455  Nutrition during pregnancy  Average weight gain during pregnancy is based on your pre-pregnancy body mass index (BMI).  See below for recommended weight gain:  Underweight (BMI <18.5), we recommend 28 to 40lb weight gain  Normal weight (BMI 18.5 to 24.9), we recommend a 25 to 35lb weight gain  Overweight (BMI 25-29.9), we recommend a 15 to 25lb weight gain  Obese (BMI >30), we recommend keeping weight gain under 20 lbs.    You should speak with your physician regarding your specific weight goals for this pregnancy.   Foods to avoid include:   Fish: avoid certain types of fish such as shark, swordfish, toy mackerel, tilefish.  Limit white (albacore) tuna to 6 oz per week.  Choose fish and shellfish such as shrimp, salmon, catfish, [place].  Food-borne illness: Pregnant women are much more likely to get Listeriosis than non-pregnant  women.  To help prevent this, avoid eating unpasteurized milk and unpasteurized milk products, hot dogs/lunch meat/cold cuts unless they are heated until steaming right before serving, refrigerated meat spreads, refrigerated smoked seafood, raw or undercooked seafood/eggs/meat.   Vitamin D helps development of the baby’s bones and teeth.  Good sources of Vitamin D include milk fortified with Vitamin D and fatty fish such as salmon.    Folic acid, also known as folate, helps develop the baby’s brain and spine.  You should make sure your vitamin contains extra folic acid - at least 400mcg.    Iron helps make red blood cells.  You need to make extra red blood cell sin pregnancy.  We recommend eating Iron-rich foods such as lean red meat, poultry, fish, dried beans and peas, iron-fortified cereals, and prune juice.  You may be recommended an iron supplement.  If so, it is absorbed more easily if taken with vitamin C-rich foods such as citrus fruits or tomatoes.    It is important to eat a well balanced diet.  A good recourse for nutrition recommendations is: www.choosemyplate.gov.  Limit caffeine intake to 200mg daily.  Some coffees/teas/sodas have very different levels of caffeine per serving, so check the nutrition labeling.   Resources: , Committee Opinion 548  Exercise during pregnancy  If you are healthy and your pregnancy is normal, it is safe to continue or start most types of exercise.   Physical activity does not increase your risk of miscarriage, low birth weight or early delivery, but you should discuss specific limitations or any complications with your physician.    Benefits of exercise during pregnancy include: reduced back pain, less constipation, promotes overall health and healthy weight gain which may decrease risks for certain pregnancy complications such as diabetes and/or preeclampsia.  The CDC recommends 150 minutes of moderate intensity aerobic exercise per week.  Moderate intensity means  that you are moving enough to raise your heart rate and start sweating, but you can talk normally.  Brisk walking, swimming/water workouts, modified yoga/pilates, and use of elliptical machines and/or stationary bikes are examples of aerobic activity.    Precautions:  Stay hydrated.  Drink plenty of water before, during and after your workout  Wear supportive clothing such as a sports bra  Do not become overheated.  Do not exercise outside when it is very hot or humid  Avoid lying flat on your back as much as possible  AVOID contact sports, skydiving, sports that risk falling (such as skiing, surfing, off-road cycling, gymnastics, horseback riding), hot yoga/hot pilates, scuba diving  Stop exercising if you experience: bleeding from the vagina, feeling dizzy/faint, shortness of breath before starting exercise, chest pain, headache, muscle weakness, calf pain or swelling, regular uterine contractions, fluid leaking from the vagina.    Postpartum exercise: Continuing exercise after you deliver your baby will help boost your energy, strengthen muscles, promote better sleep, and relieve stress.  It also may be useful in preventing postpartum depression.  150 minutes of moderate-intensity aerobic activity is recommended.  Types of exercise and when you can start a regular exercise routine may be limited by the type of delivery you had.  Please discuss with your physician prior to resuming or starting exercise.    Resources: ,   Back pain during pregnancy  Back pain is very common during pregnancy.  It may arise due to strain on your back muscles, weakness of the abdominal muscles, and pregnancy hormones.    To prevent back pain:  Wear shoes with good support. Flat shoes and high heels may not have good arch support.  Consider a firm mattress  Use good lifting practices.  Do not bend from the waist to pick things up.  Squat down and bend your knees, keeping a straight back.  Sit in chairs with good back  "support or use a small pillow behind your lower back.    Sleep on your side with one or two pillows between your legs  To ease back pain:   Exercise can help stretch strained muscles and strengthen weak muscles to promote good posture  Contact your health care provider with severe pain, pain that persists for more than 2 weeks, fever, burning during urination, or vaginal bleeding.  Resources:   Optional testing, genetic testing  There are essentially three types of \"optional\" genetic tests.  These may or may not be covered by insurance.  The CPT codes for these tests are below and you can use these to check insurance coverage.    Carrier screening- a blood test that checks for genes associated with genetic diseases which you may not have symptoms of but could carry the gene.  The recommended genes to test for carrier states include:    -Cystic fibrosis (CF): This is an inherited disease that can result in serious medical consequences- specifically pulmonary morbidity.  It requires 2 mutations to have an affected individual so patients with one mutation may not have any symptoms.  Cystic fibrosis mutations can be carried in families without a history of cystic fibrosis.  The screening test is done through blood work and is only needed once in a lifetime.  A negative screen does not give you a 0% risk of having a mutation but significantly lowers the risk.  A positive screen would require testing of the father of the baby and if positive, would be recommended for genetic counseling and possibly amniocentesis.     - Spinal muscular atrophy:  This is his inherited neurologic disease which can result in considerable deficits in the nervous system.  Testing is done through a blood sample.  Based on the results of the tests, her risk for her baby to be affected may be reduced but cannot be lowered to zero.  This testing is also only needed once in a lifetime, and so is typically offered with your first pregnancy.  "    - Fragile X: this is an inherited form of autism (but not the only type of autism). It is related to a sex chromosome, the X chromosome. Testing is done with a blood sample. If you tested positive as a carrier, the risk would depend in part on the gender of the baby.        2. Aneuploidy testing via non invasive prenatal testing, or cell free DNA ([id number] is a brand of that test): This is done through blood.  Cell free DNA can be done after 9-10 weeks and will screen for Trisomy 21 (Down Syndrome), Trisomy 13 (Patau syndrome), and Trisomy 18 (Edward Syndrome) as well as sex chromosomes.  If positive, a screening test is not definitive and further testing would be warranted, such as an amniocentesis.  If negative, it substantially lowers the risk of aneuploidy but does not take it to zero.    3. Hemoglobinopathy screening: done through blood sample; can detect sickle cell trait, sickle cell disease, and some thalassemias.  It is not able to detect all thalassemias.  Only needed once in a lifetime.    The following are CPT codes for the optional tests in pregnancy:  Cystic fibrosis screenin  Spinal Muscular atrophy screening 17099  Cell free DNA (Trisomy 21, 18, 13 and sex chromosomes) 22073    The ICD 10 code for most pregnancies is Z34.90

## 2025-04-30 LAB
ACCEPTABLE CREAT UR QL: 223.8 MG/DL (ref 20–300)
AMPHETAMINES UR QL SCN: NEGATIVE NG/ML
BARBITURATES UR QL SCN: NEGATIVE NG/ML
BENZODIAZ UR QL: NEGATIVE NG/ML
BZE UR QL: NEGATIVE NG/ML
CANNABINOIDS UR QL SCN: NEGATIVE NG/ML
METHADONE UR QL SCN: NEGATIVE NG/ML
NITRITE UR QL STRIP: NEGATIVE MCG/ML
OPIATES UR QL SCN: NEGATIVE NG/ML
PCP UR QL SCN: NEGATIVE NG/ML
PH UR: 7 [PH] (ref 4.5–8.9)
PROPOXYPH UR QL SCN: NEGATIVE NG/ML
VZV IGG SER QL IA: REACTIVE

## 2025-05-01 LAB
ABO GROUP BLD: ABNORMAL
BACTERIA UR CULT: NO GROWTH
BACTERIA UR CULT: NORMAL
BASOPHILS # BLD AUTO: 0 X10E3/UL (ref 0–0.2)
BASOPHILS NFR BLD AUTO: 0 %
BLD GP AB SCN SERPL QL: NEGATIVE
C TRACH RRNA SPEC QL NAA+PROBE: NEGATIVE
EOSINOPHIL # BLD AUTO: 0 X10E3/UL (ref 0–0.4)
EOSINOPHIL NFR BLD AUTO: 1 %
ERYTHROCYTE [DISTWIDTH] IN BLOOD BY AUTOMATED COUNT: 12.8 % (ref 11.7–15.4)
HBV SURFACE AG SERPL QL IA: NEGATIVE
HCT VFR BLD AUTO: 36.6 % (ref 34–46.6)
HCV AB SERPL QL IA: NON REACTIVE
HCV AB SERPL QL IA: NORMAL
HGB BLD-MCNC: 11.8 G/DL (ref 11.1–15.9)
HIV 1+2 AB+HIV1 P24 AG SERPL QL IA: NON REACTIVE
IMM GRANULOCYTES # BLD AUTO: 0 X10E3/UL (ref 0–0.1)
IMM GRANULOCYTES NFR BLD AUTO: 0 %
LYMPHOCYTES # BLD AUTO: 1.2 X10E3/UL (ref 0.7–3.1)
LYMPHOCYTES NFR BLD AUTO: 26 %
MCH RBC QN AUTO: 27.9 PG (ref 26.6–33)
MCHC RBC AUTO-ENTMCNC: 32.2 G/DL (ref 31.5–35.7)
MCV RBC AUTO: 87 FL (ref 79–97)
MONOCYTES # BLD AUTO: 0.4 X10E3/UL (ref 0.1–0.9)
MONOCYTES NFR BLD AUTO: 8 %
N GONORRHOEA RRNA SPEC QL NAA+PROBE: NEGATIVE
NEUTROPHILS # BLD AUTO: 2.9 X10E3/UL (ref 1.4–7)
NEUTROPHILS NFR BLD AUTO: 65 %
PLATELET # BLD AUTO: 168 X10E3/UL (ref 150–450)
RBC # BLD AUTO: 4.23 X10E6/UL (ref 3.77–5.28)
RH BLD: NEGATIVE
RUBV IGG SERPL IA-ACNC: <0.9 INDEX
T VAGINALIS RRNA SPEC QL NAA+PROBE: NEGATIVE
TREPONEMA PALLIDUM IGG+IGM AB [PRESENCE] IN SERUM OR PLASMA BY IMMUNOASSAY: NON REACTIVE
WBC # BLD AUTO: 4.5 X10E3/UL (ref 3.4–10.8)

## 2025-05-02 LAB
CONV .: NORMAL
CYTOLOGIST CVX/VAG CYTO: NORMAL
CYTOLOGY CVX/VAG DOC CYTO: NORMAL
CYTOLOGY CVX/VAG DOC THIN PREP: NORMAL
DX ICD CODE: NORMAL
Lab: NORMAL
OTHER STN SPEC: NORMAL
SERVICE CMNT-IMP: NORMAL
STAT OF ADQ CVX/VAG CYTO-IMP: NORMAL

## 2025-06-03 ENCOUNTER — ROUTINE PRENATAL (OUTPATIENT)
Dept: OBSTETRICS AND GYNECOLOGY | Facility: CLINIC | Age: 30
End: 2025-06-03
Payer: COMMERCIAL

## 2025-06-03 VITALS — DIASTOLIC BLOOD PRESSURE: 73 MMHG | BODY MASS INDEX: 23.3 KG/M2 | WEIGHT: 140 LBS | SYSTOLIC BLOOD PRESSURE: 107 MMHG

## 2025-06-03 DIAGNOSIS — Z3A.11 11 WEEKS GESTATION OF PREGNANCY: Primary | ICD-10-CM

## 2025-06-03 LAB
GLUCOSE UR STRIP-MCNC: NEGATIVE MG/DL
PROT UR STRIP-MCNC: ABNORMAL MG/DL

## 2025-06-03 NOTE — PROGRESS NOTES
Patient or patient representative verbalized consent for the use of Ambient Listening during the visit with  Jade Finley MD for chart documentation. 6/3/2025  09:58 EDT    History of Present Illness  The patient is a 29-year-old -0-1-0 at 11 weeks and 6 days, here for a routine OB visit. She is dated by a 7-week ultrasound with a due date of 2025. Her OB labs were significant for rubella nonimmune.    General Well-being  - Reports feeling well overall  - No instances of bleeding, nausea, or vomiting    Travel and Sun Exposure  - Expressed interest in traveling via air and engaging in boat excursions during her pregnancy  - Inquires about the necessity of limited sun exposure or the use of a smat that covers her abdomen    GYNECOLOGICAL HISTORY  - Gynecology History discussed    OBSTETRICAL HISTORY  - Obstetrics History discussed  -  2, Para 1  - Gestational age: 11 weeks and 6 days    Supplemental information: None      Vitals:    25 0923   BP: 107/73       Physical Exam  Gen: NAD  FHT: seen by bedside ultrasound    Results  - Laboratory Studies:    - Rubella Antibodies: Low    - Imaging:    - Ultrasound: Showed fetal movement and heartbeat     Diagnosis Plan   1. 11 weeks gestation of pregnancy  POC Urinalysis Dipstick          Assessment & Plan  1. Routine obstetric visit: 11 weeks and 6 days of gestation. Confirmed by a 7-week ultrasound with a due date of 2025. Blood pressure readings are within normal range. Rubella nonimmune status discussed, emphasizing the importance of avoiding exposure to rubella. Postpartum rubella vaccine to be administered prior to discharge. Necessity of Tdap vaccination during the third trimester discussed. Air travel generally safe until the 36th week of pregnancy. Potential use of scopolamine for seasickness considered, but due to its unavailability, alternative medications such as dicyclomine suggested. Caution advised against excessive use of these  alternatives during pregnancy and breastfeeding due to potential reduction in breast milk production. Advised to limit sun exposure and take necessary precautions to prevent sunburn. Ultrasound performed during this visit revealed fetal movement and a heartbeat. Informed that sensations akin to bubbles or flutters around the 15th to 16th week of pregnancy could be indicative of fetal movement, but such sensations are typically more pronounced around the 20th week of pregnancy. Anatomy ultrasound scheduled for subsequent visit.  - Administer postpartum rubella vaccine prior to discharge.  - Administer Tdap vaccine during the third trimester.  - Recommend alternative medications for seasickness such as dicyclomine.  - Advise limiting sun exposure and taking necessary precautions to prevent sunburn.  - Perform anatomy ultrasound at subsequent visit.    Follow-up  Scheduled for a follow-up visit in 4 weeks, during which approximately 15 to 16 weeks pregnant.      Jade Finley MD  06/03/25 09:58 EDT     No follow-ups on file.

## 2025-07-11 ENCOUNTER — ROUTINE PRENATAL (OUTPATIENT)
Dept: OBSTETRICS AND GYNECOLOGY | Facility: CLINIC | Age: 30
End: 2025-07-11
Payer: COMMERCIAL

## 2025-07-11 VITALS — SYSTOLIC BLOOD PRESSURE: 115 MMHG | DIASTOLIC BLOOD PRESSURE: 77 MMHG | WEIGHT: 145.8 LBS | BODY MASS INDEX: 24.26 KG/M2

## 2025-07-11 DIAGNOSIS — Z67.91 RH NEGATIVE STATUS DURING PREGNANCY IN SECOND TRIMESTER: ICD-10-CM

## 2025-07-11 DIAGNOSIS — Z34.80 SUPERVISION OF OTHER NORMAL PREGNANCY, ANTEPARTUM: Primary | ICD-10-CM

## 2025-07-11 DIAGNOSIS — O26.892 RH NEGATIVE STATUS DURING PREGNANCY IN SECOND TRIMESTER: ICD-10-CM

## 2025-07-11 NOTE — PROGRESS NOTES
Patient or patient representative verbalized consent for the use of Ambient Listening during the visit with  Jade Finley MD for chart documentation. 2025  11:35 EDT    History of Present Illness  The patient is a 30-year-old -0-1-0 at 17 weeks and 2 days, here for a routine OB visit. Her pregnancy is complicated by rubella, nonimmune.    Unusual Skin Sensation  - She reports experiencing an unusual sensation in her skin, which she is uncertain whether to attribute to itching or a subcutaneous issue.    Vaginal Discharge  - She mentions inconsistent vaginal discharge, alternating between feeling excessively wet one day and extremely dry the next.    - She has expressed interest in undergoing optional tests and blood work.  - An anatomy ultrasound has been scheduled for the end of the current month.      Vitals:    25 1103   BP: 115/77       Physical Exam      Results       Diagnosis Plan   1. Supervision of other normal pregnancy, antepartum  US Ob 14 + Weeks Single or First Gestation      2. Rh negative status during pregnancy in second trimester            Assessment & Plan  1. Routine obstetric visit: Stable.  - Sensations experienced are likely due to fetal movements, which can be difficult to distinguish from other sensations such as gas or itching at this early stage of pregnancy.  - The fluctuating discharge was discussed and deemed normal, especially towards the end of pregnancy.  - Monitor for any signs of infection, such as itching or a foul odor, and report any concerns.  - Anatomy ultrasound scheduled for the end of this month was confirmed.  - If the ultrasound does not provide all the necessary information, a repeat ultrasound may be required.  - Travel safety during the second trimester was discussed, with advice to avoid long trips after 36 weeks.    Follow-up  - A follow-up visit is scheduled in 4 weeks.      Jade Finley MD  25 11:34 EDT     Return in about 4 weeks (around  8/8/2025) for anatomy US, OB visit.

## 2025-08-12 ENCOUNTER — ROUTINE PRENATAL (OUTPATIENT)
Dept: OBSTETRICS AND GYNECOLOGY | Facility: CLINIC | Age: 30
End: 2025-08-12
Payer: COMMERCIAL

## 2025-08-12 VITALS — SYSTOLIC BLOOD PRESSURE: 113 MMHG | WEIGHT: 153 LBS | DIASTOLIC BLOOD PRESSURE: 64 MMHG | BODY MASS INDEX: 25.46 KG/M2

## 2025-08-12 DIAGNOSIS — Z3A.21 21 WEEKS GESTATION OF PREGNANCY: Primary | ICD-10-CM

## 2025-08-12 DIAGNOSIS — Z67.91 RH NEGATIVE STATUS DURING PREGNANCY IN SECOND TRIMESTER: ICD-10-CM

## 2025-08-12 DIAGNOSIS — Z36.89 ENCOUNTER FOR FETAL ANATOMIC SURVEY: ICD-10-CM

## 2025-08-12 DIAGNOSIS — O26.892 RH NEGATIVE STATUS DURING PREGNANCY IN SECOND TRIMESTER: ICD-10-CM

## 2025-08-12 LAB
GLUCOSE UR STRIP-MCNC: NEGATIVE MG/DL
PROT UR STRIP-MCNC: ABNORMAL MG/DL